# Patient Record
Sex: FEMALE | URBAN - METROPOLITAN AREA
[De-identification: names, ages, dates, MRNs, and addresses within clinical notes are randomized per-mention and may not be internally consistent; named-entity substitution may affect disease eponyms.]

---

## 2017-05-05 ENCOUNTER — IMPORTED ENCOUNTER (OUTPATIENT)
Dept: URBAN - METROPOLITAN AREA CLINIC 1 | Facility: CLINIC | Age: 67
End: 2017-05-05

## 2017-05-05 PROBLEM — E11.9: Noted: 2017-05-05

## 2017-05-05 PROBLEM — Z96.1: Noted: 2017-05-05

## 2017-05-05 PROBLEM — Z79.4: Noted: 2017-05-05

## 2017-05-05 PROBLEM — H04.123: Noted: 2017-05-05

## 2017-05-05 PROBLEM — H25.812: Noted: 2017-05-05

## 2017-05-05 PROBLEM — H16.143: Noted: 2017-05-05

## 2017-05-05 PROCEDURE — 92014 COMPRE OPH EXAM EST PT 1/>: CPT

## 2017-05-05 PROCEDURE — 92015 DETERMINE REFRACTIVE STATE: CPT

## 2017-05-05 NOTE — PATIENT DISCUSSION
1.  DM Type II (Insulin) without sign of diabetic retinopathy and no blot heme on dilated retinal examination today OU No Macular Edema:  Discussed the pathophysiology of diabetes and its effect on the eye and risk of blindness. Stressed the importance of strong glucose control. Advised of importance of at least yearly dilated examinations but to contact us immediately for any problems or concerns. 2. 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 Discussed the risks benefits alternatives and limitations of cataract surgery. The patient stated a full understanding and a desire to proceed with the procedure. The patient will need to return for preop appointment with cataract measurements and to have any additional questions answered and start pre-operative eye drops as directed. Phaco PCL OS Otherwise follow-up3. TRUNG w/ PEK OU-The use/continuation of artificial tears were recommended. 4.  Pseudophakia OD - StandardReturn for an appointment for H and P. with Dr. Jenna Bolden.

## 2017-05-30 ENCOUNTER — IMPORTED ENCOUNTER (OUTPATIENT)
Dept: URBAN - METROPOLITAN AREA CLINIC 1 | Facility: CLINIC | Age: 67
End: 2017-05-30

## 2017-05-30 PROBLEM — H25.812: Noted: 2017-05-30

## 2017-05-30 PROCEDURE — 92136 OPHTHALMIC BIOMETRY: CPT

## 2017-05-30 NOTE — PATIENT DISCUSSION
1.  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 Discussed the risks benefits alternatives and limitations of cataract surgery. The patient stated a full understanding and a desire to proceed with the procedure. Start pre-operative eye drops as directed. Phaco PCL OS Return for an appointment in [address] [place] [address] with Dr. Rosalinda Toure.

## 2017-06-07 ENCOUNTER — IMPORTED ENCOUNTER (OUTPATIENT)
Dept: URBAN - METROPOLITAN AREA CLINIC 1 | Facility: CLINIC | Age: 67
End: 2017-06-07

## 2017-06-08 ENCOUNTER — IMPORTED ENCOUNTER (OUTPATIENT)
Dept: URBAN - METROPOLITAN AREA CLINIC 1 | Facility: CLINIC | Age: 67
End: 2017-06-08

## 2017-06-08 PROBLEM — Z96.1: Noted: 2017-06-08

## 2017-06-08 PROCEDURE — 99024 POSTOP FOLLOW-UP VISIT: CPT

## 2017-06-08 NOTE — PATIENT DISCUSSION
POD#1 CE/IOL OS (Standard) doing well. Continue all 3 gtts as prescribed and until gone. Prednisolone BID OS Acular Qdaily OS Ocuflox TID OS Post op Warnings Reiterated RTC as scheduled w/ PMG

## 2017-07-10 ENCOUNTER — IMPORTED ENCOUNTER (OUTPATIENT)
Dept: URBAN - METROPOLITAN AREA CLINIC 1 | Facility: CLINIC | Age: 67
End: 2017-07-10

## 2017-07-10 PROBLEM — Z96.1: Noted: 2017-07-10

## 2017-07-10 PROCEDURE — 99024 POSTOP FOLLOW-UP VISIT: CPT

## 2017-07-10 NOTE — PATIENT DISCUSSION
1. POM#1 CE/IOL Standard OS doing well. Continue Lotemax/Durezol/Prednisolone BID until gone. Continue Prolensa/Ilevro/Acular QD until gone. 2. Return for an appointment for a 30 in April with Dr. Leslee Sam.

## 2022-04-02 ASSESSMENT — VISUAL ACUITY
OS_CC: 20/30-2
OS_GLARE: 20/400
OS_SC: 20/30-1
OS_CC: 20/25-2
OD_SC: 20/20

## 2022-04-02 ASSESSMENT — TONOMETRY
OD_IOP_MMHG: 14
OS_IOP_MMHG: 14